# Patient Record
(demographics unavailable — no encounter records)

---

## 2025-01-29 NOTE — HISTORY OF PRESENT ILLNESS
[FreeTextEntry1] : Patient comes for follow-up of her chronic medical problems.  Her  anxiety attacks are better after she started the citalopram.  Otherwise patient feels well without any chest pain, shortness of breath, paroxysmal nocturnal dyspnea or orthopnea.  With a rash in the right frontal skin for a while.  Given some ointments by the dermatologist but recurred

## 2025-01-29 NOTE — ASSESSMENT
[FreeTextEntry1] :  #1 hypertension. Continue with current treatment and strict low-salt diet.  CMP to lab to check kidney function and electrolytes    #2 hyperlipidemia, on low fat diet. Lipids to lab    # 3 history of goiter hypothyroidism. TSH ,T4 to lab    #4 hypovitaminosis D., being supplemented. Level sent to the lab.    # 5 Pre-diabetes. Diet advised the patient and weight loss. A1c is sent to lab.    6. Monitor liver toxicity due to chronic medication use. CMP to lab 7.  Anxiety attacks.  Better.  Continue with current escitalopram 8.  Possible seborrheic dermatitis right frontal.  To continue with a dermatologist   Plan. The patient is to return after 3 months

## 2025-01-29 NOTE — PHYSICAL EXAM
[Kyphosis] : no kyphosis [Scoliosis] : no scoliosis [de-identified] : goiter [de-identified] : Patch of redness of skin and slightly felky  in the right frontal area

## 2025-05-01 NOTE — HISTORY OF PRESENT ILLNESS
[FreeTextEntry1] : Patient comes for follow-up of her chronic medical problems..  Patient feels tired recently especially in the morning after she gets up from sleeping.  Appetite is very good and she is not depressed.  Anxiety controlled very well.  Otherwise patient feels well without any chest pain, shortness of breath, paroxysmal nocturnal dyspnea or orthopnea.

## 2025-05-01 NOTE — ASSESSMENT
[FreeTextEntry1] :  #1  Tiredness.  To have thyroid function test, CPK, B12 magnesium level and pulmonary consult for possible sleep apnea.    #2 hyperlipidemia, on low fat diet. Lipids to lab    # 3 history of goiter hypothyroidism. TSH ,T4 to lab    #4 hypovitaminosis D., being supplemented. Level sent to the lab.    # 5 Pre-diabetes. Diet advised the patient and weight loss. A1c is sent to lab.    6. Monitor liver toxicity due to chronic medication use. CMP to lab 7.  Anxiety attacks.  Better.  Continue with current escitalopram #8 hypertension.  Continue with current medications and low-salt diet.  CMP to lab to check kidney function and electrolytes 9.  Obesity.  Again advised to try to lose weight by cutting back on her calories and increasing her activity.  Health  benefits by doing this explained to her.  Approximately 15 minutes spent   Plan.  Since I am retiring at the end of June referred to another primary care physician of [place].  Total approximately 50 minutes spent

## 2025-06-23 NOTE — REVIEW OF SYSTEMS
[Skin Rash] : skin rash [de-identified] : Right frontal skin Spontaneous, unlabored and symmetrical